# Patient Record
Sex: MALE | Race: WHITE | NOT HISPANIC OR LATINO | Employment: FULL TIME | URBAN - METROPOLITAN AREA
[De-identification: names, ages, dates, MRNs, and addresses within clinical notes are randomized per-mention and may not be internally consistent; named-entity substitution may affect disease eponyms.]

---

## 2024-03-15 ENCOUNTER — OFFICE VISIT (OUTPATIENT)
Dept: PRIMARY CARE | Facility: CLINIC | Age: 64
End: 2024-03-15
Payer: COMMERCIAL

## 2024-03-15 VITALS
OXYGEN SATURATION: 97 % | SYSTOLIC BLOOD PRESSURE: 182 MMHG | HEART RATE: 88 BPM | DIASTOLIC BLOOD PRESSURE: 98 MMHG | HEIGHT: 68 IN | BODY MASS INDEX: 28.34 KG/M2 | WEIGHT: 187 LBS

## 2024-03-15 DIAGNOSIS — J30.81 ALLERGIC RHINITIS DUE TO ANIMAL HAIR AND DANDER: ICD-10-CM

## 2024-03-15 DIAGNOSIS — Z87.898 HISTORY OF ALCOHOL USE: ICD-10-CM

## 2024-03-15 DIAGNOSIS — I10 BENIGN ESSENTIAL HYPERTENSION: ICD-10-CM

## 2024-03-15 DIAGNOSIS — M53.9 MULTILEVEL DEGENERATIVE DISC DISEASE: ICD-10-CM

## 2024-03-15 DIAGNOSIS — Z76.89 ENCOUNTER TO ESTABLISH CARE: Primary | ICD-10-CM

## 2024-03-15 PROCEDURE — 3080F DIAST BP >= 90 MM HG: CPT

## 2024-03-15 PROCEDURE — 3077F SYST BP >= 140 MM HG: CPT

## 2024-03-15 PROCEDURE — 99204 OFFICE O/P NEW MOD 45 MIN: CPT

## 2024-03-15 RX ORDER — METHOCARBAMOL 750 MG/1
TABLET, FILM COATED ORAL
COMMUNITY

## 2024-03-15 RX ORDER — METHYLPREDNISOLONE 4 MG/1
TABLET ORAL
COMMUNITY
End: 2024-04-22 | Stop reason: ALTCHOICE

## 2024-03-15 RX ORDER — HYDROCHLOROTHIAZIDE 12.5 MG/1
12.5 TABLET ORAL DAILY
Qty: 30 TABLET | Refills: 1 | Status: SHIPPED | OUTPATIENT
Start: 2024-03-15 | End: 2024-05-14

## 2024-03-15 RX ORDER — FLUTICASONE PROPIONATE 50 MCG
1 SPRAY, SUSPENSION (ML) NASAL DAILY
Qty: 16 G | Refills: 12 | Status: SHIPPED | OUTPATIENT
Start: 2024-03-15 | End: 2025-03-15

## 2024-03-15 RX ORDER — FLUTICASONE PROPIONATE 50 MCG
1 SPRAY, SUSPENSION (ML) NASAL DAILY
Qty: 16 G | Refills: 12 | Status: SHIPPED | OUTPATIENT
Start: 2024-03-15 | End: 2024-03-15 | Stop reason: SDUPTHER

## 2024-03-15 RX ORDER — IBUPROFEN 200 MG
200 TABLET ORAL EVERY 6 HOURS
COMMUNITY

## 2024-03-15 RX ORDER — LOSARTAN POTASSIUM 50 MG/1
50 TABLET ORAL DAILY
Qty: 30 TABLET | Refills: 1 | Status: SHIPPED | OUTPATIENT
Start: 2024-03-15 | End: 2024-04-22 | Stop reason: SDUPTHER

## 2024-03-15 RX ORDER — MONTELUKAST SODIUM 10 MG/1
10 TABLET ORAL NIGHTLY
Qty: 90 TABLET | Refills: 1 | Status: SHIPPED | OUTPATIENT
Start: 2024-03-15 | End: 2024-09-11

## 2024-03-15 ASSESSMENT — ENCOUNTER SYMPTOMS
PALPITATIONS: 0
NUMBNESS: 1
FATIGUE: 0
LIGHT-HEADEDNESS: 0
SHORTNESS OF BREATH: 0
DIZZINESS: 0

## 2024-03-15 ASSESSMENT — PAIN SCALES - GENERAL: PAINLEVEL: 0-NO PAIN

## 2024-03-15 ASSESSMENT — PATIENT HEALTH QUESTIONNAIRE - PHQ9
1. LITTLE INTEREST OR PLEASURE IN DOING THINGS: NOT AT ALL
SUM OF ALL RESPONSES TO PHQ9 QUESTIONS 1 AND 2: 0
2. FEELING DOWN, DEPRESSED OR HOPELESS: NOT AT ALL

## 2024-03-15 NOTE — PROGRESS NOTES
"Subjective   Patient ID: Yoandy Durán is a 63 y.o. male who presents for Establish Care (Discuss bp meds /la).    Hx HTN, allergic rhinitis, hx of alcohol use (remission x 4 years), cervical and lumbar DDD    Other providers: Dr. Jake Arias (sports-management for back pain), has not seen PCP in 4 years and has been out medication for 4 years    HTN: uncontrolled. In the past was on Losartan- hydrochlorothiazide 100-12.5 mg and 10 mg Amlodipine, has been off for 4 years. No medication side effects when he was on the, Home BP not checking. Denies chest pain, heart palpitations, SOB, dizziness, headaches, changes of vision, syncope, leg swelling.      Allergic rhinitis: currently on dialy anti-histamien which not controlling symptoms. In the past has been on Singulair and Flonase. Will re-send in.       Review of Systems   Constitutional:  Negative for fatigue.   Eyes:  Negative for visual disturbance.   Respiratory:  Negative for shortness of breath.    Cardiovascular:  Negative for chest pain, palpitations and leg swelling.   Neurological:  Positive for numbness. Negative for dizziness, syncope and light-headedness.       Objective   BP (!) 182/98   Pulse 88   Ht 1.727 m (5' 8\")   Wt 84.8 kg (187 lb)   SpO2 97%   BMI 28.43 kg/m²     Physical Exam  Constitutional:       General: He is not in acute distress.     Appearance: Normal appearance.   Cardiovascular:      Rate and Rhythm: Normal rate and regular rhythm.      Heart sounds: No murmur heard.  Pulmonary:      Effort: Pulmonary effort is normal.      Breath sounds: Normal breath sounds. No wheezing, rhonchi or rales.   Skin:     General: Skin is warm and dry.      Findings: No rash.   Neurological:      Mental Status: He is alert.   Psychiatric:         Mood and Affect: Mood and affect normal.         Assessment/Plan   Diagnoses and all orders for this visit:  Encounter to establish care  Allergic rhinitis due to animal hair and dander  -     " montelukast (Singulair) 10 mg tablet; Take 1 tablet (10 mg) by mouth once daily at bedtime.  -     fluticasone (Flonase) 50 mcg/actuation nasal spray; Administer 1 spray into each nostril once daily. Shake gently. Before first use, prime pump. After use, clean tip and replace cap.  Benign essential hypertension  -     losartan (Cozaar) 50 mg tablet; Take 1 tablet (50 mg) by mouth once daily.  -     hydroCHLOROthiazide (Microzide) 12.5 mg tablet; Take 1 tablet (12.5 mg) by mouth once daily.  History of alcohol use  Multilevel degenerative disc disease  Follow-up one month for BP check, once BP stable will schedule CPE. Will order labs in 1-2 months.

## 2024-04-22 ENCOUNTER — OFFICE VISIT (OUTPATIENT)
Dept: PRIMARY CARE | Facility: CLINIC | Age: 64
End: 2024-04-22
Payer: COMMERCIAL

## 2024-04-22 VITALS
HEART RATE: 83 BPM | DIASTOLIC BLOOD PRESSURE: 90 MMHG | OXYGEN SATURATION: 98 % | HEIGHT: 68 IN | SYSTOLIC BLOOD PRESSURE: 168 MMHG | WEIGHT: 185 LBS | BODY MASS INDEX: 28.04 KG/M2

## 2024-04-22 DIAGNOSIS — I10 BENIGN ESSENTIAL HYPERTENSION: ICD-10-CM

## 2024-04-22 PROCEDURE — 99213 OFFICE O/P EST LOW 20 MIN: CPT

## 2024-04-22 PROCEDURE — 3080F DIAST BP >= 90 MM HG: CPT

## 2024-04-22 PROCEDURE — 3077F SYST BP >= 140 MM HG: CPT

## 2024-04-22 RX ORDER — LOSARTAN POTASSIUM 100 MG/1
100 TABLET ORAL DAILY
Qty: 90 TABLET | Refills: 0 | Status: SHIPPED | OUTPATIENT
Start: 2024-04-22 | End: 2024-07-21

## 2024-04-22 ASSESSMENT — ENCOUNTER SYMPTOMS
LIGHT-HEADEDNESS: 0
PALPITATIONS: 0
DIZZINESS: 0
SHORTNESS OF BREATH: 0
FATIGUE: 0

## 2024-04-22 ASSESSMENT — PAIN SCALES - GENERAL: PAINLEVEL: 0-NO PAIN

## 2024-04-22 NOTE — PROGRESS NOTES
"Subjective   Patient ID: Yoandy Durán is a 63 y.o. male who presents for Follow-up (Bp /la).    Hx HTN, allergic rhinitis, hx of alcohol use (remission x 4 years), cervical and lumbar DDD    Other providers: Dr. Jake Arias ([facility] for back pain), has not seen PCP in 4 years and has been out medication for 4 years    HTN: improving. In the past was on Losartan- hydrochlorothiazide 100-12.5 mg and 10 mg Amlodipine, has been off for 4 years. Last visit started on 50 mg Losartan and 12.5 mg hydrochlorothiazide. No medication side effects. Home BP not checking. Denies chest pain, heart palpitations, SOB, dizziness, headaches, changes of vision, syncope, leg swelling.      Allergic rhinitis: currently on dialy anti-histamien which not controlling symptoms. In the past has been on Singulair and Flonase. Will re-send in.       Review of Systems   Constitutional:  Negative for fatigue.   Eyes:  Negative for visual disturbance.   Respiratory:  Negative for shortness of breath.    Cardiovascular:  Negative for chest pain, palpitations and leg swelling.   Neurological:  Negative for dizziness, syncope and light-headedness.       Objective   /90   Pulse 83   Ht 1.727 m (5' 8\")   Wt 83.9 kg (185 lb)   SpO2 98%   BMI 28.13 kg/m²     Physical Exam  Constitutional:       General: He is not in acute distress.     Appearance: Normal appearance.   Cardiovascular:      Rate and Rhythm: Normal rate and regular rhythm.      Heart sounds: No murmur heard.  Pulmonary:      Effort: Pulmonary effort is normal.      Breath sounds: Normal breath sounds. No wheezing, rhonchi or rales.   Musculoskeletal:      Right lower leg: No edema.      Left lower leg: No edema.   Skin:     General: Skin is warm and dry.      Findings: No rash.   Neurological:      Mental Status: He is alert.   Psychiatric:         Mood and Affect: Mood and affect normal.         Assessment/Plan   Diagnoses and all orders for this visit:  Benign " essential hypertension  -     losartan (Cozaar) 100 mg tablet; Take 1 tablet (100 mg) by mouth once daily.  Follow-up one month for BP check, once BP stable will schedule CPE. Will order labs in 1-2 months.

## 2024-05-14 DIAGNOSIS — I10 BENIGN ESSENTIAL HYPERTENSION: ICD-10-CM

## 2024-05-14 RX ORDER — HYDROCHLOROTHIAZIDE 12.5 MG/1
12.5 TABLET ORAL DAILY
Qty: 30 TABLET | Refills: 0 | Status: SHIPPED | OUTPATIENT
Start: 2024-05-14 | End: 2024-05-22 | Stop reason: SDUPTHER

## 2024-05-22 ENCOUNTER — OFFICE VISIT (OUTPATIENT)
Dept: PRIMARY CARE | Facility: CLINIC | Age: 64
End: 2024-05-22
Payer: COMMERCIAL

## 2024-05-22 VITALS
BODY MASS INDEX: 28.04 KG/M2 | OXYGEN SATURATION: 97 % | HEART RATE: 90 BPM | SYSTOLIC BLOOD PRESSURE: 150 MMHG | WEIGHT: 185 LBS | DIASTOLIC BLOOD PRESSURE: 80 MMHG | HEIGHT: 68 IN

## 2024-05-22 DIAGNOSIS — I10 BENIGN ESSENTIAL HYPERTENSION: ICD-10-CM

## 2024-05-22 PROCEDURE — 99213 OFFICE O/P EST LOW 20 MIN: CPT

## 2024-05-22 PROCEDURE — 3077F SYST BP >= 140 MM HG: CPT

## 2024-05-22 PROCEDURE — 3079F DIAST BP 80-89 MM HG: CPT

## 2024-05-22 RX ORDER — AMLODIPINE BESYLATE 5 MG/1
5 TABLET ORAL DAILY
Qty: 30 TABLET | Refills: 1 | Status: SHIPPED | OUTPATIENT
Start: 2024-05-22 | End: 2024-07-21

## 2024-05-22 RX ORDER — HYDROCHLOROTHIAZIDE 12.5 MG/1
12.5 TABLET ORAL DAILY
Qty: 90 TABLET | Refills: 0 | Status: SHIPPED | OUTPATIENT
Start: 2024-05-22

## 2024-05-22 ASSESSMENT — ENCOUNTER SYMPTOMS
SHORTNESS OF BREATH: 0
LIGHT-HEADEDNESS: 0
PALPITATIONS: 0
FATIGUE: 0
DIZZINESS: 0

## 2024-05-22 ASSESSMENT — PATIENT HEALTH QUESTIONNAIRE - PHQ9
2. FEELING DOWN, DEPRESSED OR HOPELESS: NOT AT ALL
1. LITTLE INTEREST OR PLEASURE IN DOING THINGS: NOT AT ALL
SUM OF ALL RESPONSES TO PHQ9 QUESTIONS 1 AND 2: 0

## 2024-05-22 ASSESSMENT — PAIN SCALES - GENERAL: PAINLEVEL: 0-NO PAIN

## 2024-05-22 NOTE — PROGRESS NOTES
"Subjective   Patient ID: Yoandy Durán is a 63 y.o. male who presents for Hypertension (Follow up /la).    Hx HTN, allergic rhinitis, hx of alcohol use (remission x 4 years), cervical and lumbar DDD    Other providers: Dr. Jake Arias ([facility] for back pain), has not seen PCP in 4 years and has been out medication for 4 years    HTN: improving. Last visit increased to 100 mg Losartan, also on 12.5 mg amlodipine. In the past was on Losartan- hydrochlorothiazide 100-12.5 mg and 10 mg Amlodipine, has been off for 4 years. No medication side effects. Home BP not checking. Denies chest pain, heart palpitations, SOB, dizziness, headaches, changes of vision, syncope, leg swelling.      Allergic rhinitis: last visit started on Singulair, which has made significant improvement. Also on daily anti-histamine and Flonase.         Review of Systems   Constitutional:  Negative for fatigue.   Eyes:  Negative for visual disturbance.   Respiratory:  Negative for shortness of breath.    Cardiovascular:  Negative for chest pain, palpitations and leg swelling.   Neurological:  Negative for dizziness, syncope and light-headedness.       Objective   /80   Pulse 90   Ht 1.727 m (5' 8\")   Wt 83.9 kg (185 lb)   SpO2 97%   BMI 28.13 kg/m²     Physical Exam  Constitutional:       General: He is not in acute distress.     Appearance: Normal appearance.   Cardiovascular:      Rate and Rhythm: Normal rate and regular rhythm.      Heart sounds: No murmur heard.  Pulmonary:      Effort: Pulmonary effort is normal.      Breath sounds: Normal breath sounds. No wheezing, rhonchi or rales.   Musculoskeletal:      Right lower leg: No edema.      Left lower leg: No edema.   Skin:     General: Skin is warm and dry.      Findings: No rash.   Neurological:      Mental Status: He is alert.   Psychiatric:         Mood and Affect: Mood and affect normal.         Assessment/Plan   Diagnoses and all orders for this visit:  Benign " essential hypertension  -     amLODIPine (Norvasc) 5 mg tablet; Take 1 tablet (5 mg) by mouth once daily.  -     hydroCHLOROthiazide (Microzide) 12.5 mg tablet; Take 1 tablet (12.5 mg) by mouth once daily.  Follow-up one month, will need to order labs next visit.

## 2024-06-17 ENCOUNTER — OFFICE VISIT (OUTPATIENT)
Dept: PRIMARY CARE | Facility: CLINIC | Age: 64
End: 2024-06-17
Payer: COMMERCIAL

## 2024-06-17 VITALS
WEIGHT: 186 LBS | DIASTOLIC BLOOD PRESSURE: 82 MMHG | OXYGEN SATURATION: 97 % | HEART RATE: 96 BPM | SYSTOLIC BLOOD PRESSURE: 144 MMHG | BODY MASS INDEX: 28.19 KG/M2 | HEIGHT: 68 IN

## 2024-06-17 DIAGNOSIS — Z13.220 SCREENING FOR LIPID DISORDERS: ICD-10-CM

## 2024-06-17 DIAGNOSIS — Z11.59 ENCOUNTER FOR HEPATITIS C SCREENING TEST FOR LOW RISK PATIENT: ICD-10-CM

## 2024-06-17 DIAGNOSIS — I10 BENIGN ESSENTIAL HYPERTENSION: Primary | ICD-10-CM

## 2024-06-17 PROCEDURE — 3077F SYST BP >= 140 MM HG: CPT

## 2024-06-17 PROCEDURE — 3079F DIAST BP 80-89 MM HG: CPT

## 2024-06-17 PROCEDURE — 99213 OFFICE O/P EST LOW 20 MIN: CPT

## 2024-06-17 RX ORDER — HYDROCHLOROTHIAZIDE 12.5 MG/1
12.5 TABLET ORAL DAILY
Qty: 90 TABLET | Refills: 0 | Status: SHIPPED | OUTPATIENT
Start: 2024-06-17

## 2024-06-17 RX ORDER — AMLODIPINE BESYLATE 10 MG/1
10 TABLET ORAL DAILY
Qty: 90 TABLET | Refills: 0 | Status: SHIPPED | OUTPATIENT
Start: 2024-06-17 | End: 2024-09-15

## 2024-06-17 ASSESSMENT — PAIN SCALES - GENERAL: PAINLEVEL: 0-NO PAIN

## 2024-06-17 ASSESSMENT — ENCOUNTER SYMPTOMS
FATIGUE: 0
PALPITATIONS: 0
DIZZINESS: 0
LIGHT-HEADEDNESS: 0
SHORTNESS OF BREATH: 0

## 2024-06-17 ASSESSMENT — PATIENT HEALTH QUESTIONNAIRE - PHQ9
1. LITTLE INTEREST OR PLEASURE IN DOING THINGS: NOT AT ALL
2. FEELING DOWN, DEPRESSED OR HOPELESS: NOT AT ALL
SUM OF ALL RESPONSES TO PHQ9 QUESTIONS 1 AND 2: 0

## 2024-06-17 NOTE — PROGRESS NOTES
"Subjective   Patient ID: Yoandy Durán is a 63 y.o. male who presents for Hypertension (Follow up ).    Hx HTN, allergic rhinitis, hx of alcohol use (remission x 4 years), cervical and lumbar DDD    Other providers: Dr. Jake Arias ([facility] for back pain), has not seen PCP in 4 years and has been out medication for 4 years    HTN: improving. Last visit started on 5 mg Amlodipine. Also on 100 mg Losartan, also on 12.5 mg HCTZ. In the past was on Losartan- hydrochlorothiazide 100-12.5 mg and 10 mg Amlodipine, has been off for 4 years. No medication side effects. Home BP not checking. Denies chest pain, heart palpitations, SOB, dizziness, headaches, changes of vision, syncope, leg swelling.      Allergic rhinitis: Controlled on Singulair, which has made significant improvement. Also on daily anti-histamine and Flonase.         Review of Systems   Constitutional:  Negative for fatigue.   Eyes:  Negative for visual disturbance.   Respiratory:  Negative for shortness of breath.    Cardiovascular:  Negative for chest pain, palpitations and leg swelling.   Neurological:  Negative for dizziness, syncope and light-headedness.       Objective   /82   Pulse 96   Ht 1.727 m (5' 8\")   Wt 84.4 kg (186 lb)   SpO2 97%   BMI 28.28 kg/m²     Physical Exam  Constitutional:       General: He is not in acute distress.     Appearance: Normal appearance.   Cardiovascular:      Rate and Rhythm: Normal rate and regular rhythm.      Heart sounds: No murmur heard.  Pulmonary:      Effort: Pulmonary effort is normal.      Breath sounds: Normal breath sounds. No wheezing, rhonchi or rales.   Musculoskeletal:      Right lower leg: No edema.      Left lower leg: No edema.   Skin:     General: Skin is warm and dry.      Findings: No rash.   Neurological:      Mental Status: He is alert.   Psychiatric:         Mood and Affect: Mood and affect normal.         Assessment/Plan   Diagnoses and all orders for this visit:  Benign " essential hypertension  -     CBC and Auto Differential; Future  -     Comprehensive metabolic panel; Future  -     amLODIPine (Norvasc) 10 mg tablet; Take 1 tablet (10 mg) by mouth once daily.  -     hydroCHLOROthiazide (Microzide) 12.5 mg tablet; Take 1 tablet (12.5 mg) by mouth once daily.  Screening for lipid disorders  -     Lipid panel; Future  Encounter for hepatitis C screening test for low risk patient  -     Hepatitis C Antibody; Future  Increase amlodipine to 10 mg, follow-up in 2-3 months.

## 2024-08-16 DIAGNOSIS — I10 BENIGN ESSENTIAL HYPERTENSION: ICD-10-CM

## 2024-08-16 RX ORDER — LOSARTAN POTASSIUM 100 MG/1
100 TABLET ORAL DAILY
Qty: 90 TABLET | Refills: 1 | Status: SHIPPED | OUTPATIENT
Start: 2024-08-16 | End: 2025-02-12

## 2024-08-19 ENCOUNTER — LAB (OUTPATIENT)
Dept: LAB | Facility: LAB | Age: 64
End: 2024-08-19
Payer: COMMERCIAL

## 2024-08-19 ENCOUNTER — TELEPHONE (OUTPATIENT)
Dept: PRIMARY CARE | Facility: CLINIC | Age: 64
End: 2024-08-19

## 2024-08-19 DIAGNOSIS — Z11.59 ENCOUNTER FOR HEPATITIS C SCREENING TEST FOR LOW RISK PATIENT: ICD-10-CM

## 2024-08-19 DIAGNOSIS — Z13.220 SCREENING FOR LIPID DISORDERS: ICD-10-CM

## 2024-08-19 DIAGNOSIS — I10 BENIGN ESSENTIAL HYPERTENSION: ICD-10-CM

## 2024-08-19 LAB
ALBUMIN SERPL BCP-MCNC: 4.4 G/DL (ref 3.4–5)
ALP SERPL-CCNC: 52 U/L (ref 33–136)
ALT SERPL W P-5'-P-CCNC: 16 U/L (ref 10–52)
ANION GAP SERPL CALC-SCNC: 14 MMOL/L (ref 10–20)
AST SERPL W P-5'-P-CCNC: 17 U/L (ref 9–39)
BASOPHILS # BLD AUTO: 0.07 X10*3/UL (ref 0–0.1)
BASOPHILS NFR BLD AUTO: 1.1 %
BILIRUB SERPL-MCNC: 0.5 MG/DL (ref 0–1.2)
BUN SERPL-MCNC: 29 MG/DL (ref 6–23)
CALCIUM SERPL-MCNC: 9.7 MG/DL (ref 8.6–10.6)
CHLORIDE SERPL-SCNC: 102 MMOL/L (ref 98–107)
CHOLEST SERPL-MCNC: 213 MG/DL (ref 0–199)
CHOLESTEROL/HDL RATIO: 4.5
CO2 SERPL-SCNC: 31 MMOL/L (ref 21–32)
CREAT SERPL-MCNC: 2.07 MG/DL (ref 0.5–1.3)
EGFRCR SERPLBLD CKD-EPI 2021: 36 ML/MIN/1.73M*2
EOSINOPHIL # BLD AUTO: 0.21 X10*3/UL (ref 0–0.7)
EOSINOPHIL NFR BLD AUTO: 3.3 %
ERYTHROCYTE [DISTWIDTH] IN BLOOD BY AUTOMATED COUNT: 13.1 % (ref 11.5–14.5)
GLUCOSE SERPL-MCNC: 98 MG/DL (ref 74–99)
HCT VFR BLD AUTO: 43.9 % (ref 41–52)
HCV AB SER QL: NONREACTIVE
HDLC SERPL-MCNC: 46.9 MG/DL
HGB BLD-MCNC: 14.4 G/DL (ref 13.5–17.5)
IMM GRANULOCYTES # BLD AUTO: 0.02 X10*3/UL (ref 0–0.7)
IMM GRANULOCYTES NFR BLD AUTO: 0.3 % (ref 0–0.9)
LDLC SERPL CALC-MCNC: 138 MG/DL
LYMPHOCYTES # BLD AUTO: 1.32 X10*3/UL (ref 1.2–4.8)
LYMPHOCYTES NFR BLD AUTO: 21 %
MCH RBC QN AUTO: 29.1 PG (ref 26–34)
MCHC RBC AUTO-ENTMCNC: 32.8 G/DL (ref 32–36)
MCV RBC AUTO: 89 FL (ref 80–100)
MONOCYTES # BLD AUTO: 0.37 X10*3/UL (ref 0.1–1)
MONOCYTES NFR BLD AUTO: 5.9 %
NEUTROPHILS # BLD AUTO: 4.31 X10*3/UL (ref 1.2–7.7)
NEUTROPHILS NFR BLD AUTO: 68.4 %
NON HDL CHOLESTEROL: 166 MG/DL (ref 0–149)
NRBC BLD-RTO: 0 /100 WBCS (ref 0–0)
PLATELET # BLD AUTO: 207 X10*3/UL (ref 150–450)
POTASSIUM SERPL-SCNC: 4.5 MMOL/L (ref 3.5–5.3)
PROT SERPL-MCNC: 7 G/DL (ref 6.4–8.2)
RBC # BLD AUTO: 4.95 X10*6/UL (ref 4.5–5.9)
SODIUM SERPL-SCNC: 142 MMOL/L (ref 136–145)
TRIGL SERPL-MCNC: 143 MG/DL (ref 0–149)
VLDL: 29 MG/DL (ref 0–40)
WBC # BLD AUTO: 6.3 X10*3/UL (ref 4.4–11.3)

## 2024-08-19 PROCEDURE — 36415 COLL VENOUS BLD VENIPUNCTURE: CPT

## 2024-08-19 PROCEDURE — 80053 COMPREHEN METABOLIC PANEL: CPT

## 2024-08-19 PROCEDURE — 86803 HEPATITIS C AB TEST: CPT

## 2024-08-19 PROCEDURE — 80061 LIPID PANEL: CPT

## 2024-08-19 PROCEDURE — 85025 COMPLETE CBC W/AUTO DIFF WBC: CPT

## 2024-08-19 NOTE — TELEPHONE ENCOUNTER
We can discuss lab results in more detail at upcoming appointment as long as patient is feeling okay. Patient kidney function was very low, however since I have no prior labs to compare, even labs from 2019 did not include is GFR, unknown if this has been a chronic issue, or if its new onset. If patient if fatigue, nausea, vomiting, and overall not feeling well in the past week then most likely acute kidney injury, if feels find then probably has been chronic problem for years. Also please re-schedule his appointment for 30 min to discuss HTNB/labs/kidney disease

## 2024-08-20 NOTE — TELEPHONE ENCOUNTER
Relayed provider message to patient. Patient had no questions and will discuss further with provider at upcoming office visit

## 2024-08-26 ENCOUNTER — OFFICE VISIT (OUTPATIENT)
Dept: PRIMARY CARE | Facility: CLINIC | Age: 64
End: 2024-08-26
Payer: COMMERCIAL

## 2024-08-26 VITALS
WEIGHT: 183 LBS | SYSTOLIC BLOOD PRESSURE: 140 MMHG | DIASTOLIC BLOOD PRESSURE: 80 MMHG | HEIGHT: 68 IN | BODY MASS INDEX: 27.74 KG/M2 | OXYGEN SATURATION: 96 % | HEART RATE: 91 BPM

## 2024-08-26 DIAGNOSIS — N18.32 STAGE 3B CHRONIC KIDNEY DISEASE (MULTI): ICD-10-CM

## 2024-08-26 DIAGNOSIS — I10 BENIGN ESSENTIAL HYPERTENSION: Primary | ICD-10-CM

## 2024-08-26 LAB
CREAT UR-MCNC: 169.3 MG/DL
MICROALBUMIN UR-MCNC: 145 MG/L (ref 0–23)
MICROALBUMIN/CREAT UR: 85.6 UG/MG CREAT

## 2024-08-26 PROCEDURE — 3008F BODY MASS INDEX DOCD: CPT

## 2024-08-26 PROCEDURE — 82043 UR ALBUMIN QUANTITATIVE: CPT

## 2024-08-26 PROCEDURE — 3077F SYST BP >= 140 MM HG: CPT

## 2024-08-26 PROCEDURE — 3079F DIAST BP 80-89 MM HG: CPT

## 2024-08-26 PROCEDURE — 99213 OFFICE O/P EST LOW 20 MIN: CPT

## 2024-08-26 RX ORDER — METOPROLOL SUCCINATE 25 MG/1
25 TABLET, EXTENDED RELEASE ORAL DAILY
Qty: 30 TABLET | Refills: 1 | Status: SHIPPED | OUTPATIENT
Start: 2024-08-26 | End: 2024-10-25

## 2024-08-26 ASSESSMENT — ENCOUNTER SYMPTOMS
LIGHT-HEADEDNESS: 0
SHORTNESS OF BREATH: 0
FATIGUE: 0
PALPITATIONS: 0
DIZZINESS: 0

## 2024-08-26 ASSESSMENT — PATIENT HEALTH QUESTIONNAIRE - PHQ9
SUM OF ALL RESPONSES TO PHQ9 QUESTIONS 1 AND 2: 0
1. LITTLE INTEREST OR PLEASURE IN DOING THINGS: NOT AT ALL
2. FEELING DOWN, DEPRESSED OR HOPELESS: NOT AT ALL

## 2024-08-26 ASSESSMENT — PAIN SCALES - GENERAL: PAINLEVEL: 0-NO PAIN

## 2024-08-26 NOTE — PROGRESS NOTES
"Subjective   Patient ID: Yoandy Durán is a 63 y.o. male who presents for Follow-up (Bp/ bloodwork /la).    Hx HTN, allergic rhinitis, hx of alcohol use (remission x 4 years), cervical and lumbar DDD    Other providers: Dr. Jake Arias ([facility] for back pain), has not seen PCP in 4 years and has been out medication for 4 years    HTN: improving. Last visit increased to 10 mg Amlodipine. Also on 100 mg Losartan, also on 12.5 mg HCTZ. In the past was on Losartan- hydrochlorothiazide 100-12.5 mg and 10 mg Amlodipine, has been off for 4 years. No medication side effects. Home BP not checking. Denies chest pain, heart palpitations, SOB, dizziness, headaches, changes of vision, syncope, leg swelling.      Labs: recent labs showed GFR of 36. Only previous GFR was 61 back in 2018 to compare too. But creatinine was normal 5 years ago. Patient having no acute symptoms. States has been taking creatine supplement for years.    Not discussed:  Allergic rhinitis: Controlled on Singulair, which has made significant improvement. Also on daily anti-histamine and Flonase.             Review of Systems   Constitutional:  Negative for fatigue.   Eyes:  Negative for visual disturbance.   Respiratory:  Negative for shortness of breath.    Cardiovascular:  Negative for chest pain, palpitations and leg swelling.   Neurological:  Negative for dizziness, syncope and light-headedness.       Objective   /80   Pulse 91   Ht 1.727 m (5' 8\")   Wt 83 kg (183 lb)   SpO2 96%   BMI 27.83 kg/m²     Physical Exam  Constitutional:       General: He is not in acute distress.     Appearance: Normal appearance.   Cardiovascular:      Rate and Rhythm: Normal rate and regular rhythm.      Heart sounds: No murmur heard.  Pulmonary:      Effort: Pulmonary effort is normal.      Breath sounds: Normal breath sounds. No wheezing, rhonchi or rales.   Musculoskeletal:      Right lower leg: No edema.      Left lower leg: No edema.   Skin:    "  General: Skin is warm and dry.      Findings: No rash.   Neurological:      Mental Status: He is alert.   Psychiatric:         Mood and Affect: Mood and affect normal.         Assessment/Plan   Diagnoses and all orders for this visit:  Benign essential hypertension  -     metoprolol succinate XL (Toprol-XL) 25 mg 24 hr tablet; Take 1 tablet (25 mg) by mouth once daily. Do not crush or chew.  Stage 3b chronic kidney disease (Multi)  -     Referral to Nephrology; Future  -     Albumin-Creatinine Ratio, Urine Random; Future  -     Renal function panel; Future  -Stop hydrochlorothiazide, start beta-blocker. Recheck labs in couple weeks. Stop creatine supplement, and increase water intake. If GFR is improving, can cancel nephrologist appointment, but should call to schedule now in case booking out. Follow-up one month.

## 2024-08-27 ENCOUNTER — TELEPHONE (OUTPATIENT)
Dept: PRIMARY CARE | Facility: CLINIC | Age: 64
End: 2024-08-27
Payer: COMMERCIAL

## 2024-08-27 NOTE — TELEPHONE ENCOUNTER
Mild to moderate elevation of protein in urine matches other kidney testing, will monitor for improvement

## 2024-09-10 DIAGNOSIS — J30.81 ALLERGIC RHINITIS DUE TO ANIMAL HAIR AND DANDER: ICD-10-CM

## 2024-09-10 RX ORDER — MONTELUKAST SODIUM 10 MG/1
10 TABLET ORAL NIGHTLY
Qty: 90 TABLET | Refills: 3 | Status: SHIPPED | OUTPATIENT
Start: 2024-09-10

## 2024-09-23 ENCOUNTER — LAB (OUTPATIENT)
Dept: LAB | Facility: LAB | Age: 64
End: 2024-09-23
Payer: COMMERCIAL

## 2024-09-23 DIAGNOSIS — N18.32 STAGE 3B CHRONIC KIDNEY DISEASE (MULTI): ICD-10-CM

## 2024-09-23 LAB
ALBUMIN SERPL BCP-MCNC: 4.8 G/DL (ref 3.4–5)
ANION GAP SERPL CALC-SCNC: 14 MMOL/L (ref 10–20)
BUN SERPL-MCNC: 29 MG/DL (ref 6–23)
CALCIUM SERPL-MCNC: 9.8 MG/DL (ref 8.6–10.6)
CHLORIDE SERPL-SCNC: 102 MMOL/L (ref 98–107)
CO2 SERPL-SCNC: 28 MMOL/L (ref 21–32)
CREAT SERPL-MCNC: 1.77 MG/DL (ref 0.5–1.3)
EGFRCR SERPLBLD CKD-EPI 2021: 43 ML/MIN/1.73M*2
GLUCOSE SERPL-MCNC: 92 MG/DL (ref 74–99)
PHOSPHATE SERPL-MCNC: 3.8 MG/DL (ref 2.5–4.9)
POTASSIUM SERPL-SCNC: 4.3 MMOL/L (ref 3.5–5.3)
SODIUM SERPL-SCNC: 140 MMOL/L (ref 136–145)

## 2024-09-23 PROCEDURE — 80069 RENAL FUNCTION PANEL: CPT

## 2024-09-23 PROCEDURE — 36415 COLL VENOUS BLD VENIPUNCTURE: CPT

## 2024-09-26 ENCOUNTER — TELEPHONE (OUTPATIENT)
Dept: PRIMARY CARE | Facility: CLINIC | Age: 64
End: 2024-09-26
Payer: COMMERCIAL

## 2024-09-30 ENCOUNTER — OFFICE VISIT (OUTPATIENT)
Dept: PRIMARY CARE | Facility: CLINIC | Age: 64
End: 2024-09-30
Payer: COMMERCIAL

## 2024-09-30 ENCOUNTER — TELEPHONE (OUTPATIENT)
Dept: PRIMARY CARE | Facility: CLINIC | Age: 64
End: 2024-09-30

## 2024-09-30 VITALS
WEIGHT: 179 LBS | SYSTOLIC BLOOD PRESSURE: 140 MMHG | OXYGEN SATURATION: 96 % | HEIGHT: 68 IN | HEART RATE: 74 BPM | DIASTOLIC BLOOD PRESSURE: 76 MMHG | BODY MASS INDEX: 27.13 KG/M2

## 2024-09-30 DIAGNOSIS — J30.81 ALLERGIC RHINITIS DUE TO ANIMAL HAIR AND DANDER: ICD-10-CM

## 2024-09-30 DIAGNOSIS — M53.9 MULTILEVEL DEGENERATIVE DISC DISEASE: ICD-10-CM

## 2024-09-30 DIAGNOSIS — Z12.11 SCREENING FOR COLON CANCER: ICD-10-CM

## 2024-09-30 DIAGNOSIS — Z00.00 ANNUAL PHYSICAL EXAM: Primary | ICD-10-CM

## 2024-09-30 DIAGNOSIS — N18.32 STAGE 3B CHRONIC KIDNEY DISEASE (MULTI): Primary | ICD-10-CM

## 2024-09-30 DIAGNOSIS — I10 BENIGN ESSENTIAL HYPERTENSION: ICD-10-CM

## 2024-09-30 DIAGNOSIS — N18.32 STAGE 3B CHRONIC KIDNEY DISEASE (MULTI): ICD-10-CM

## 2024-09-30 PROCEDURE — 99213 OFFICE O/P EST LOW 20 MIN: CPT

## 2024-09-30 PROCEDURE — 99396 PREV VISIT EST AGE 40-64: CPT

## 2024-09-30 PROCEDURE — 3077F SYST BP >= 140 MM HG: CPT

## 2024-09-30 PROCEDURE — 3008F BODY MASS INDEX DOCD: CPT

## 2024-09-30 PROCEDURE — 3078F DIAST BP <80 MM HG: CPT

## 2024-09-30 RX ORDER — METOPROLOL SUCCINATE 50 MG/1
50 TABLET, EXTENDED RELEASE ORAL DAILY
Qty: 90 TABLET | Refills: 0 | Status: SHIPPED | OUTPATIENT
Start: 2024-09-30 | End: 2024-12-29

## 2024-09-30 ASSESSMENT — ENCOUNTER SYMPTOMS
LIGHT-HEADEDNESS: 0
BLOOD IN STOOL: 0
DIAPHORESIS: 0
BACK PAIN: 1
FREQUENCY: 0
ADENOPATHY: 0
SORE THROAT: 0
DYSURIA: 0
FEVER: 0
DIZZINESS: 0
VOMITING: 0
NAUSEA: 0
SHORTNESS OF BREATH: 0
DIARRHEA: 0
FATIGUE: 0
WHEEZING: 0
COUGH: 0
PALPITATIONS: 0
HEMATURIA: 0
DIFFICULTY URINATING: 0

## 2024-09-30 ASSESSMENT — PAIN SCALES - GENERAL: PAINLEVEL: 0-NO PAIN

## 2024-09-30 NOTE — TELEPHONE ENCOUNTER
Dr. Townsend already responded back. She is in agreement with current BP medications. She suggested one more additional kidney function whitney called Cystatin C, that I have added onto your other future blood work. Otherwise she feels you are stable until your January visit with her.

## 2024-09-30 NOTE — PROGRESS NOTES
Subjective   Patient ID: Yoandy Durán is a 63 y.o. male who presents for Follow-up (HTN/CKD /la).    Hx HTN, allergic rhinitis, hx of alcohol use (remission x 4 years), cervical and lumbar DDD    Other providers: Dr. Jake Arias ([facility] for back pain), has not seen PCP in 4 years and has been out medication for 4 years    HTN: improving. Last visit started on 25 mg Metoprolol. No side effects. Also on 10 mg Amlodipine and 100 mg Losartan. Was off his normal BP medications for 4 years. No medication side effects. Home BP not checking. Denies chest pain, heart palpitations, SOB, dizziness, headaches, changes of vision, syncope, leg swelling.      Low Kidney function: Last visit stopped diuretic, and patient stopped creatine supplement. GFR improved from 36 to 43 and 2.07 to 1.77 (9/23/24). Only previous GFR was 61 back in 2018 to compare too. But creatinine was normal 5 years ago. Referred to Dr. Townsend (nephrologist) appointment not until 1/27/24. Urine albumin elevated at 145.     Allergic rhinitis: Controlled on Singulair, which has made significant improvement. Also on daily anti-histamine and Flonase.    Fhx: Mother TIA    HM: Colon screening, will do cologuard. PSA discussed skip. Lung screening never smoker. Vaccinations Tdap 6/2015 UTD, Shingrex declined, flu and covid declines.              Review of Systems   Constitutional:  Negative for diaphoresis, fatigue and fever.   HENT:  Negative for congestion, hearing loss and sore throat.    Eyes:  Negative for visual disturbance.   Respiratory:  Negative for cough, shortness of breath and wheezing.    Cardiovascular:  Negative for chest pain, palpitations and leg swelling.   Gastrointestinal:  Negative for blood in stool, diarrhea, nausea and vomiting.   Genitourinary:  Negative for difficulty urinating, dysuria, frequency, hematuria and urgency.   Musculoskeletal:  Positive for back pain.   Skin:  Negative for rash.   Allergic/Immunologic:  "Negative for immunocompromised state.   Neurological:  Negative for dizziness, syncope and light-headedness.   Hematological:  Negative for adenopathy.   Psychiatric/Behavioral:  Negative for suicidal ideas.        Objective   /76   Pulse 74   Ht 1.727 m (5' 8\")   Wt 81.2 kg (179 lb)   SpO2 96%   BMI 27.22 kg/m²     Physical Exam  Constitutional:       General: He is not in acute distress.     Appearance: Normal appearance.   HENT:      Right Ear: Tympanic membrane and ear canal normal.      Left Ear: Tympanic membrane and ear canal normal.      Nose: Nose normal.      Mouth/Throat:      Mouth: Mucous membranes are moist.      Pharynx: Oropharynx is clear. No oropharyngeal exudate or posterior oropharyngeal erythema.   Eyes:      Extraocular Movements: Extraocular movements intact.      Conjunctiva/sclera: Conjunctivae normal.      Pupils: Pupils are equal, round, and reactive to light.   Neck:      Thyroid: No thyroid mass or thyromegaly.   Cardiovascular:      Rate and Rhythm: Normal rate and regular rhythm.      Pulses: Normal pulses.      Heart sounds: No murmur heard.     No gallop.   Pulmonary:      Effort: Pulmonary effort is normal.      Breath sounds: Normal breath sounds. No wheezing, rhonchi or rales.   Abdominal:      General: Bowel sounds are normal.      Palpations: Abdomen is soft. There is no hepatomegaly, splenomegaly or mass.      Tenderness: There is no abdominal tenderness. There is no guarding.   Musculoskeletal:         General: Normal range of motion.      Right lower leg: No edema.      Left lower leg: No edema.   Lymphadenopathy:      Cervical: No cervical adenopathy.   Skin:     General: Skin is warm and dry.      Findings: No rash.   Neurological:      General: No focal deficit present.      Mental Status: He is alert.      Motor: Motor function is intact. No weakness.   Psychiatric:         Mood and Affect: Mood and affect normal.         Thought Content: Thought content " normal.         Assessment/Plan   Diagnoses and all orders for this visit:  Annual physical exam  Benign essential hypertension  -     metoprolol succinate XL (Toprol-XL) 50 mg 24 hr tablet; Take 1 tablet (50 mg) by mouth once daily. Do not crush or chew.  -     Basic metabolic panel; Future  Stage 3b chronic kidney disease (Multi)  -     Basic metabolic panel; Future  Screening for colon cancer  -     Cologuard® colon cancer screening; Future  Allergic rhinitis due to animal hair and dander  Multilevel degenerative disc disease  Will message nephrologist for consult since appointment is so far out. For now increase Metoprolol for better HTN control and repeat BMP in 4-8 weeks. Follow-up 3 months.

## 2024-10-14 ENCOUNTER — LAB (OUTPATIENT)
Dept: LAB | Facility: LAB | Age: 64
End: 2024-10-14
Payer: COMMERCIAL

## 2024-10-14 DIAGNOSIS — I10 BENIGN ESSENTIAL HYPERTENSION: ICD-10-CM

## 2024-10-14 DIAGNOSIS — N18.32 STAGE 3B CHRONIC KIDNEY DISEASE (MULTI): ICD-10-CM

## 2024-10-14 LAB
ANION GAP SERPL CALC-SCNC: 11 MMOL/L (ref 10–20)
BUN SERPL-MCNC: 28 MG/DL (ref 6–23)
CALCIUM SERPL-MCNC: 10.2 MG/DL (ref 8.6–10.6)
CHLORIDE SERPL-SCNC: 100 MMOL/L (ref 98–107)
CO2 SERPL-SCNC: 33 MMOL/L (ref 21–32)
CREAT SERPL-MCNC: 1.76 MG/DL (ref 0.5–1.3)
EGFRCR SERPLBLD CKD-EPI 2021: 43 ML/MIN/1.73M*2
GLUCOSE SERPL-MCNC: 109 MG/DL (ref 74–99)
POTASSIUM SERPL-SCNC: 4.2 MMOL/L (ref 3.5–5.3)
SODIUM SERPL-SCNC: 140 MMOL/L (ref 136–145)

## 2024-10-14 PROCEDURE — 80048 BASIC METABOLIC PNL TOTAL CA: CPT

## 2024-10-14 PROCEDURE — 82610 CYSTATIN C: CPT

## 2024-10-14 PROCEDURE — 36415 COLL VENOUS BLD VENIPUNCTURE: CPT

## 2024-10-15 LAB
CYSTATIN C SERPL-MCNC: 1.3 MG/L (ref 0.5–1.2)
GFR/BSA.PRED SERPLBLD CYS-BASED-ARV: 54 ML/MIN/BSA

## 2024-11-25 ENCOUNTER — LAB (OUTPATIENT)
Dept: LAB | Facility: LAB | Age: 64
End: 2024-11-25
Payer: COMMERCIAL

## 2024-12-30 ENCOUNTER — APPOINTMENT (OUTPATIENT)
Dept: PRIMARY CARE | Facility: CLINIC | Age: 64
End: 2024-12-30
Payer: COMMERCIAL

## 2025-01-02 ENCOUNTER — APPOINTMENT (OUTPATIENT)
Dept: PRIMARY CARE | Facility: CLINIC | Age: 65
End: 2025-01-02
Payer: COMMERCIAL

## 2025-01-02 VITALS
DIASTOLIC BLOOD PRESSURE: 100 MMHG | OXYGEN SATURATION: 98 % | WEIGHT: 183 LBS | HEART RATE: 80 BPM | BODY MASS INDEX: 27.74 KG/M2 | SYSTOLIC BLOOD PRESSURE: 144 MMHG | HEIGHT: 68 IN

## 2025-01-02 DIAGNOSIS — J30.81 ALLERGIC RHINITIS DUE TO ANIMAL HAIR AND DANDER: ICD-10-CM

## 2025-01-02 DIAGNOSIS — I10 BENIGN ESSENTIAL HYPERTENSION: ICD-10-CM

## 2025-01-02 DIAGNOSIS — N18.32 STAGE 3B CHRONIC KIDNEY DISEASE (MULTI): Primary | ICD-10-CM

## 2025-01-02 PROCEDURE — 3080F DIAST BP >= 90 MM HG: CPT

## 2025-01-02 PROCEDURE — 3077F SYST BP >= 140 MM HG: CPT

## 2025-01-02 PROCEDURE — 3008F BODY MASS INDEX DOCD: CPT

## 2025-01-02 PROCEDURE — 99213 OFFICE O/P EST LOW 20 MIN: CPT

## 2025-01-02 RX ORDER — AMLODIPINE BESYLATE 10 MG/1
10 TABLET ORAL DAILY
Qty: 90 TABLET | Refills: 1 | Status: SHIPPED | OUTPATIENT
Start: 2025-01-02 | End: 2025-07-01

## 2025-01-02 RX ORDER — LOSARTAN POTASSIUM 100 MG/1
100 TABLET ORAL DAILY
Qty: 90 TABLET | Refills: 1 | Status: SHIPPED | OUTPATIENT
Start: 2025-01-02 | End: 2025-07-01

## 2025-01-02 RX ORDER — METOPROLOL SUCCINATE 50 MG/1
50 TABLET, EXTENDED RELEASE ORAL DAILY
Qty: 90 TABLET | Refills: 1 | Status: SHIPPED | OUTPATIENT
Start: 2025-01-02 | End: 2025-07-01

## 2025-01-02 ASSESSMENT — ENCOUNTER SYMPTOMS
PALPITATIONS: 0
FATIGUE: 0
DIZZINESS: 0
LIGHT-HEADEDNESS: 0
SHORTNESS OF BREATH: 0

## 2025-01-02 ASSESSMENT — PAIN SCALES - GENERAL: PAINLEVEL_OUTOF10: 0-NO PAIN

## 2025-01-02 NOTE — PROGRESS NOTES
"Subjective   Patient ID: Yoandy Durán is a 64 y.o. male who presents for Follow-up and Medication Problem (Patient is wanting to discuss blood pressure, unsure of which ones he should be taking/dd).    Hx HTN, allergic rhinitis, hx of alcohol use (remission x 4 years), cervical and lumbar DDD    Other providers: Dr. Jake Arias ([facility]ports[facility] for back pain), has not seen PCP in 4 years and has been out medication for 4 years    HTN: not controlled but patient only taking 2 out 3 of his medications and not sure which one. Should be on 50 mg Metoprolol, 10 mg Amlodipine, and 100 mg Losartan. Home BP not checking. Denies chest pain, heart palpitations, SOB, dizziness, headaches, changes of vision, syncope, leg swelling.      Low Kidney function: In the past stopped diuretic, and patient stopped creatine supplement. GFR improved from 36 to 43 and 2.07 to 1.77 (9/23/24). Only previous GFR was 61 back in 2018 to compare too. But creatinine was normal 5 years ago. Referred to Dr. Townsend (nephrologist) appointment not until 1/27/24. Urine albumin elevated at 145. Has apt with nephrologist this month.     Allergic rhinitis: Controlled on Singulair, which has made significant improvement. Also on daily anti-histamine and Flonase.           Review of Systems   Constitutional:  Negative for fatigue.   Eyes:  Negative for visual disturbance.   Respiratory:  Negative for shortness of breath.    Cardiovascular:  Negative for chest pain, palpitations and leg swelling.   Neurological:  Negative for dizziness, syncope and light-headedness.       Objective   BP (!) 144/100   Pulse 80   Ht 1.727 m (5' 8\")   Wt 83 kg (183 lb)   SpO2 98%   BMI 27.83 kg/m²     Physical Exam  Constitutional:       General: He is not in acute distress.     Appearance: Normal appearance.   Cardiovascular:      Rate and Rhythm: Normal rate and regular rhythm.      Heart sounds: No murmur heard.  Pulmonary:      Effort: Pulmonary effort is " normal.      Breath sounds: Normal breath sounds. No wheezing, rhonchi or rales.   Musculoskeletal:      Right lower leg: No edema.      Left lower leg: No edema.   Skin:     General: Skin is warm and dry.      Findings: No rash.   Neurological:      Mental Status: He is alert.   Psychiatric:         Mood and Affect: Mood and affect normal.         Assessment/Plan   Diagnoses and all orders for this visit:  Benign essential hypertension  -     amLODIPine (Norvasc) 10 mg tablet; Take 1 tablet (10 mg) by mouth once daily.  -     losartan (Cozaar) 100 mg tablet; Take 1 tablet (100 mg) by mouth once daily.  -     metoprolol succinate XL (Toprol-XL) 50 mg 24 hr tablet; Take 1 tablet (50 mg) by mouth once daily. Do not crush or chew.  -     Comprehensive metabolic panel; Future  Will not adjust medication since currently not taking all three of his medications. Follow-up in 3 months, whatever one he is missing, take half tablet for 1-2 weeks then increase to full tablet. Obtain labs.

## 2025-01-27 ENCOUNTER — APPOINTMENT (OUTPATIENT)
Dept: NEPHROLOGY | Facility: CLINIC | Age: 65
End: 2025-01-27
Payer: COMMERCIAL

## 2025-01-27 VITALS
BODY MASS INDEX: 27.13 KG/M2 | WEIGHT: 179 LBS | TEMPERATURE: 97.7 F | HEIGHT: 68 IN | HEART RATE: 80 BPM | OXYGEN SATURATION: 98 % | SYSTOLIC BLOOD PRESSURE: 149 MMHG | DIASTOLIC BLOOD PRESSURE: 93 MMHG

## 2025-01-27 DIAGNOSIS — I10 BENIGN ESSENTIAL HYPERTENSION: ICD-10-CM

## 2025-01-27 DIAGNOSIS — N18.32 STAGE 3B CHRONIC KIDNEY DISEASE (MULTI): Primary | ICD-10-CM

## 2025-01-27 PROCEDURE — 3077F SYST BP >= 140 MM HG: CPT | Performed by: INTERNAL MEDICINE

## 2025-01-27 PROCEDURE — 3080F DIAST BP >= 90 MM HG: CPT | Performed by: INTERNAL MEDICINE

## 2025-01-27 PROCEDURE — 99205 OFFICE O/P NEW HI 60 MIN: CPT | Performed by: INTERNAL MEDICINE

## 2025-01-27 PROCEDURE — 3008F BODY MASS INDEX DOCD: CPT | Performed by: INTERNAL MEDICINE

## 2025-01-27 PROCEDURE — G2211 COMPLEX E/M VISIT ADD ON: HCPCS | Performed by: INTERNAL MEDICINE

## 2025-01-27 NOTE — PATIENT INSTRUCTIONS
Dear Yoandy-it was nice meeting you nephrology clinic today discuss the following    # Chronic kidney stage IIIa-baseline kidney function 45-55%.  Will continue to monitor.  We discussed appropriate fluid intake, avoid nonsteroidal anti-inflammatory drugs.  Will hold off creatinine supplement at this time    # Hypertension-accepted control.  Continue amlodipine 10 mg, losartan 100 mg, metoprolol 50 mg.  Today we discussed checking blood pressure at home.  Average reading should range 110-140.  If blood pressure reading is consistently above 150 please call my office to adjust medications    # Will repeat blood work today-recommendation to follow.  Repeat blood work prior to next visit in 6 months    Shashi Townsend MD, MS, A[facility]REILL[facility]   Clinical  - [facility] [employer] [facility] [employer]   Nephrologist - [facility]

## 2025-01-27 NOTE — PROGRESS NOTES
"Subjective       Yoandy Durán is a 64 y.o. male who has past medical history of hypertension, chronic back pain with significant NSAID use, creatinine supplement was coming to see me today initial consultation for elevated serum creatinine per PCP    Yoandy came alone today.  No immediate complaints or concerns.  He was made aware of worsening kidney function in the summer 2024 since then he stopped taking NSAIDs and creatinine supplements.  He denies lower urinary tract symptoms.  No leg swelling or shortness of breath.  No cardiac comorbidities.  No diabetes.  He does not check blood pressure at home.  He denies family history of kidney disease.  He has a [employer] shop.  He vapes marijuana.  He chewed tobacco.  He does not drink      Objective   BP (!) 149/93 (BP Location: Left arm, Patient Position: Standing, BP Cuff Size: Adult)   Pulse 80   Temp 36.5 °C (97.7 °F)   Ht 1.727 m (5' 8\")   Wt 81.2 kg (179 lb)   SpO2 98%   BMI 27.22 kg/m²   Wt Readings from Last 3 Encounters:   01/27/25 81.2 kg (179 lb)   01/02/25 83 kg (183 lb)   09/30/24 81.2 kg (179 lb)       Physical Exam    General appearance: no distress awake and alert on room air, euvolemic on exam  Eyes: non-icteric  HEENT: atrumatic head, PEERLA, moist mucosa  Skin: no apparent rash  Heart: NSR, S1, S2 normal, no murmur or gallop  Lungs: Symmetrical expansion,CTA bilat no wheezing/crackles  Abdomen: soft, nt/nd, obese  Extremities: no edema bilat  Neuro: No FND,asterixis, no focal deficits noticed        Review of Systems     Constitutional: no fever, no chills, no recent weight gain and no recent weight loss.   Eyes: no blurred vision and no diplopia.   ENT: no hearing loss, no earache, no sore throat, no swollen glands in the neck and no nasal discharge.   Cardiovascular: no chest pain, no palpitations and no lower extremity edema.   Respiratory: no shortness of breath, no chronic cough and no shortness of breath during exertion. " "  Gastrointestinal: no abdominal pain, no constipation, no heartburn, no vomiting, no bloody stools and no change in bowel movements.   Genitourinary: no dysuria and no hematuria.   Musculoskeletal: Back pain  Skin: no rashes and no skin lesions.   Neurological: no headaches and no dizziness.   Psychiatric: no confusion, no depression and no anxiety.   Endocrine: no heat intolerance, no cold intolerance, appetite not increased, no thyroid disorder, no increased urinary frequency and no dry skin.   Hematologic/Lymphatic: does not bleed easily and does not bruise easily.   All other systems have been reviewed and are negative for complaint.         Data Review                   No results found for: \"URICACID\"        No results found for: \"HGBA1C\"              No lab exists for component: \"CR\", \"PHOSPHORUS\"        Albumin/Creatinine Ratio   Date Value Ref Range Status   08/26/2024 85.6 ug/mg Creat Final            RFP  Recent Labs     10/14/24  0923 09/23/24  0944 08/19/24  0844 08/11/19  0441 08/10/19  0500 08/09/19  1349 04/24/19  1847 01/04/19  0930 12/22/18  1654 06/27/18  1230    140 142 139 135 125* 137   < > 129* 137   K 4.2 4.3 4.5 3.9 4.3 4.2 4.2   < > 4.0 4.1    102 102 100 95* 84* 96*   < > 88* 97   CO2 33* 28 31 27 25 16* 22*   < > 22* 20*   BUN 28* 29* 29* 19 24 32* 20   < > 13 18   CREATININE 1.76* 1.77* 2.07* 1.4 1.3 1.5 1.5   < > 1.3 1.3   GLUCOSE 109* 92 98 126* 84 101* 92   < > 126* 97   CALCIUM 10.2 9.8 9.7 9.4 9.3 9.3 9.1   < > 8.9 9.4   PHOS  --  3.8  --   --  3.1  --  4.6*  --   --   --    EGFR 43* 43* 36*  --   --  51 51  --  61 61   ANIONGAP 11 14 14 12 15 25* 19   < > 19 20*    < > = values in this interval not displayed.        Urineanalysis  Recent Labs     04/24/19  1847 12/22/18  1654   COLORU PALE YELLOW PALE YELLOW   APPEARANCEU CLEAR CLEAR   SPECGRAVU 1.006 1.007   YAMILETH 6.0 5.5   PROTUR NEGATIVE NEGATIVE   GLUCOSEU NEGATIVE NEGATIVE   BLOODU NEGATIVE NEGATIVE   KETONESU " "NEGATIVE NEGATIVE   BILIRUBINU NEGATIVE NEGATIVE   NITRITEU NEGATIVE NEGATIVE   LEUKOCYTESU NEGATIVE NEGATIVE       Urine Electrolytes  Recent Labs     08/26/24  1041 04/24/19  1847 12/22/18  1654   CREATU 169.3  --   --    PROTUR  --  NEGATIVE NEGATIVE   ALBUMINUR 145.0*  --   --    MICROALBCREA 85.6  --   --         Urine Micro  Recent Labs     04/24/19  1847 12/22/18  1654   WBCU NONE SEEN NONE SEEN   RBCU 3 NONE SEEN   HYALCASTU NONE SEEN  --    SQUAMEPIU NONE SEEN  --    BACTERIAU NEGATIVE  --         Iron  No results for input(s): \"IRON\", \"TIBC\", \"IRONSAT\", \"FERRITIN\" in the last 09801 hours.       Current Outpatient Medications on File Prior to Visit   Medication Sig Dispense Refill    amLODIPine (Norvasc) 10 mg tablet Take 1 tablet (10 mg) by mouth once daily. 90 tablet 1    fluticasone (Flonase) 50 mcg/actuation nasal spray Administer 1 spray into each nostril once daily. Shake gently. Before first use, prime pump. After use, clean tip and replace cap. 16 g 12    ibuprofen 200 mg tablet Take 1 tablet (200 mg) by mouth if needed for mild pain (1 - 3). Pt. Reports once weekly      losartan (Cozaar) 100 mg tablet Take 1 tablet (100 mg) by mouth once daily. 90 tablet 1    methocarbamol (Robaxin) 750 mg tablet take 1 tablet by mouth two times every day      metoprolol succinate XL (Toprol-XL) 50 mg 24 hr tablet Take 1 tablet (50 mg) by mouth once daily. Do not crush or chew. 90 tablet 1    montelukast (Singulair) 10 mg tablet TAKE ONE TABLET BY MOUTH ONCE DAILY AT BEDTIME 90 tablet 3     No current facility-administered medications on file prior to visit.           Assessment and Plan       Yoandy Durán  is a 64 y.o. male who has past medical history of hypertension, chronic back pain with significant NSAID use, creatinine supplement was coming to see me today initial consultation for elevated serum creatinine per PCP    # Elevated serum creatinine/chronic kidney disease stage IIIa/A1-  -Baseline serum " creatinine 1.7-2, GFR 35-45 by CKD-EPI.  Cystatin C 1.3, GFR Cystatin C 54 which I think is more accurate  -I think this gentleman has a background of chronic kidney disease due to NSAID use.  Serum creatinine is significantly elevated (up to 2) most likely due to creatinine supplements.  Currently he is off NSAIDs/creatinine supplements.  Will start monitoring serum creatinine and Cystatin C  -Within normal electrolytes  -Prior spot test ACR is negative  -No recent kidney image to review-will defer at this time      # Hypertension-elevated today.  He is anxious about the visit?  Whitecoat syndrome  -Current medication amlodipine 10 mg, losartan 100 mg, metoprolol 50 mg.  Currently off diuretics  -He will start checking at home and report if it is elevated    # No significant anemia-continue to monitor    # CKD-MBD.  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 If to be done- we recommend holding ACEi/ARBS/diuretics 24 hrs prior to contrast exposure and ensure appropriate hydration   - Ensure well hydration  - Limit salt in diet  - No smoking    Patient received CKD education and counselling  Blood work today  Follow-up in 6 months with review of other canasta prior to visit    Shashi Townsend MD, MS, ASHER OREILLY   Clinical  - [facility] [employer]   Nephrologist - [facility]

## 2025-04-14 ENCOUNTER — OFFICE VISIT (OUTPATIENT)
Dept: PRIMARY CARE | Facility: CLINIC | Age: 65
End: 2025-04-14
Payer: COMMERCIAL

## 2025-04-14 VITALS
OXYGEN SATURATION: 96 % | WEIGHT: 187 LBS | BODY MASS INDEX: 28.43 KG/M2 | DIASTOLIC BLOOD PRESSURE: 80 MMHG | SYSTOLIC BLOOD PRESSURE: 142 MMHG | HEART RATE: 62 BPM

## 2025-04-14 DIAGNOSIS — J30.81 ALLERGIC RHINITIS DUE TO ANIMAL HAIR AND DANDER: ICD-10-CM

## 2025-04-14 DIAGNOSIS — F51.01 PRIMARY INSOMNIA: ICD-10-CM

## 2025-04-14 DIAGNOSIS — I10 BENIGN ESSENTIAL HYPERTENSION: Primary | ICD-10-CM

## 2025-04-14 PROCEDURE — 3079F DIAST BP 80-89 MM HG: CPT

## 2025-04-14 PROCEDURE — 99214 OFFICE O/P EST MOD 30 MIN: CPT

## 2025-04-14 PROCEDURE — 3077F SYST BP >= 140 MM HG: CPT

## 2025-04-14 RX ORDER — FLUTICASONE PROPIONATE 50 MCG
1 SPRAY, SUSPENSION (ML) NASAL DAILY
Qty: 16 G | Refills: 12 | Status: SHIPPED | OUTPATIENT
Start: 2025-04-14 | End: 2026-04-14

## 2025-04-14 RX ORDER — OLMESARTAN MEDOXOMIL 40 MG/1
40 TABLET ORAL DAILY
Qty: 90 TABLET | Refills: 0 | Status: SHIPPED | OUTPATIENT
Start: 2025-04-14 | End: 2025-10-11

## 2025-04-14 RX ORDER — HYDROXYZINE HYDROCHLORIDE 25 MG/1
25 TABLET, FILM COATED ORAL DAILY
Qty: 30 TABLET | Refills: 1 | Status: SHIPPED | OUTPATIENT
Start: 2025-04-14 | End: 2025-06-13

## 2025-04-14 ASSESSMENT — ENCOUNTER SYMPTOMS
PALPITATIONS: 0
SLEEP DISTURBANCE: 1
DIZZINESS: 0
LIGHT-HEADEDNESS: 0
FATIGUE: 0
SHORTNESS OF BREATH: 0

## 2025-04-14 ASSESSMENT — PAIN SCALES - GENERAL: PAINLEVEL_OUTOF10: 5

## 2025-04-14 NOTE — PROGRESS NOTES
Subjective   Patient ID: Yoandy Durán is a 64 y.o. male who presents for Blood Pressure Check.    Hx HTN, allergic rhinitis, hx of alcohol use (remission x 4 years), cervical and lumbar DDD    Other providers: Dr. Jake rAias ([facility]), has not seen PCP in 4 years and has been out medication for 4 years    HTN: uncontrolled but improving. Should be on 50 mg Metoprolol, 10 mg Amlodipine, and 100 mg Losartan. Home BP not checking. Denies chest pain, heart palpitations, SOB, dizziness, headaches, changes of vision, syncope, leg swelling.      Sleep issues: more trouble falling asleep than staying asleep. Has not tried OTC besides melatonin. Has good sleep hygiene.     Not discussed:   Low Kidney function:, UPDATE saw nephrologista nd has 6 month follow-up. Due for labs reprinted. Recall:  In the past stopped diuretic, and patient stopped creatine supplement. GFR improved from 36 to 43 and 2.07 to 1.77 (9/23/24). Only previous GFR was 61 back in 2018 to compare too. But creatinine was normal 5 years ago. Referred to Dr. Townsend (nephrologist) appointment not until 1/27/24. Urine albumin elevated at 145. Has apt with nephrologist this month.     Allergic rhinitis: Controlled on Singulair, which has made significant improvement. Also on daily anti-histamine and Flonase.             Review of Systems   Constitutional:  Negative for fatigue.   Eyes:  Negative for visual disturbance.   Respiratory:  Negative for shortness of breath.    Cardiovascular:  Negative for chest pain, palpitations and leg swelling.   Neurological:  Negative for dizziness, syncope and light-headedness.   Psychiatric/Behavioral:  Positive for sleep disturbance.        Objective   /80   Wt 84.8 kg (187 lb)   BMI 28.43 kg/m²     Physical Exam  Constitutional:       General: He is not in acute distress.     Appearance: Normal appearance.   Cardiovascular:      Rate and Rhythm: Normal rate and regular rhythm.       Heart sounds: No murmur heard.  Pulmonary:      Effort: Pulmonary effort is normal.      Breath sounds: Normal breath sounds. No wheezing, rhonchi or rales.   Skin:     General: Skin is warm and dry.      Findings: No rash.   Neurological:      Mental Status: He is alert.   Psychiatric:         Mood and Affect: Mood and affect normal.         Assessment/Plan   Diagnoses and all orders for this visit:  Benign essential hypertension  -     olmesartan (BENIcar) 40 mg tablet; Take 1 tablet (40 mg) by mouth once daily.  Allergic rhinitis due to animal hair and dander  -     fluticasone (Flonase) 50 mcg/actuation nasal spray; Administer 1 spray into each nostril once daily. Shake gently. Before first use, prime pump. After use, clean tip and replace cap.  Primary insomnia  -     hydrOXYzine HCL (Atarax) 25 mg tablet; Take 1 tablet (25 mg) by mouth once daily.  Follow-up one month for HTN/Sleep

## 2025-04-21 ENCOUNTER — OFFICE VISIT (OUTPATIENT)
Dept: PRIMARY CARE | Facility: CLINIC | Age: 65
End: 2025-04-21
Payer: COMMERCIAL

## 2025-04-21 VITALS
HEIGHT: 68 IN | DIASTOLIC BLOOD PRESSURE: 76 MMHG | OXYGEN SATURATION: 98 % | SYSTOLIC BLOOD PRESSURE: 152 MMHG | BODY MASS INDEX: 28.49 KG/M2 | HEART RATE: 84 BPM | WEIGHT: 188 LBS

## 2025-04-21 DIAGNOSIS — I10 BENIGN ESSENTIAL HYPERTENSION: ICD-10-CM

## 2025-04-21 DIAGNOSIS — J30.81 ALLERGIC RHINITIS DUE TO ANIMAL HAIR AND DANDER: ICD-10-CM

## 2025-04-21 DIAGNOSIS — M10.371 ACUTE GOUT DUE TO RENAL IMPAIRMENT INVOLVING TOE OF RIGHT FOOT: Primary | ICD-10-CM

## 2025-04-21 PROCEDURE — 3077F SYST BP >= 140 MM HG: CPT

## 2025-04-21 PROCEDURE — 3008F BODY MASS INDEX DOCD: CPT

## 2025-04-21 PROCEDURE — 3078F DIAST BP <80 MM HG: CPT

## 2025-04-21 PROCEDURE — 99214 OFFICE O/P EST MOD 30 MIN: CPT

## 2025-04-21 RX ORDER — PREDNISONE 10 MG/1
TABLET ORAL
Qty: 27 TABLET | Refills: 0 | Status: SHIPPED | OUTPATIENT
Start: 2025-04-21 | End: 2025-05-01

## 2025-04-21 RX ORDER — MONTELUKAST SODIUM 10 MG/1
10 TABLET ORAL NIGHTLY
Qty: 90 TABLET | Refills: 1 | Status: SHIPPED | OUTPATIENT
Start: 2025-04-21

## 2025-04-21 RX ORDER — AMLODIPINE BESYLATE 10 MG/1
10 TABLET ORAL DAILY
Qty: 90 TABLET | Refills: 1 | Status: SHIPPED | OUTPATIENT
Start: 2025-04-21 | End: 2025-10-18

## 2025-04-21 RX ORDER — METOPROLOL SUCCINATE 50 MG/1
50 TABLET, EXTENDED RELEASE ORAL DAILY
Qty: 90 TABLET | Refills: 1 | Status: SHIPPED | OUTPATIENT
Start: 2025-04-21 | End: 2025-10-18

## 2025-04-21 ASSESSMENT — ENCOUNTER SYMPTOMS
ARTHRALGIAS: 1
JOINT SWELLING: 1
CHILLS: 0
FEVER: 0

## 2025-04-21 ASSESSMENT — PATIENT HEALTH QUESTIONNAIRE - PHQ9
2. FEELING DOWN, DEPRESSED OR HOPELESS: NOT AT ALL
SUM OF ALL RESPONSES TO PHQ9 QUESTIONS 1 AND 2: 0
1. LITTLE INTEREST OR PLEASURE IN DOING THINGS: NOT AT ALL

## 2025-04-21 ASSESSMENT — PAIN SCALES - GENERAL: PAINLEVEL_OUTOF10: 7

## 2025-04-21 NOTE — PROGRESS NOTES
"Subjective   Patient ID: Yoandy Durán is a 64 y.o. male who presents for Gout.    Hx HTN, allergic rhinitis, hx of alcohol use (remission x 4 years), cervical and lumbar DDD    Other providers: Dr. Jake Arias ([facility]), has not seen PCP in 4 years and has been out medication for 4 years    Acute concerns: right foot x 6 days, and has progressively worsened. Patient feels may be gout but insure. Wife was given colchicine yesterday which helped.     Not discussed:  HTN: uncontrolled but improving. Should be on 50 mg Metoprolol, 10 mg Amlodipine, and 100 mg Losartan. Home BP not checking. Denies chest pain, heart palpitations, SOB, dizziness, headaches, changes of vision, syncope, leg swelling.    Sleep issues: more trouble falling asleep than staying asleep. Has not tried OTC besides melatonin. Has good sleep hygiene.   Low Kidney function:, UPDATE saw nephrologista nd has 6 month follow-up. Due for labs reprinted. Recall:  In the past stopped diuretic, and patient stopped creatine supplement. GFR improved from 36 to 43 and 2.07 to 1.77 (9/23/24). Only previous GFR was 61 back in 2018 to compare too. But creatinine was normal 5 years ago. Referred to Dr. Townsend (nephrologist) appointment not until 1/27/24. Urine albumin elevated at 145. Has apt with nephrologist this month.   Allergic rhinitis: Controlled on Singulair, which has made significant improvement. Also on daily anti-histamine and Flonase.             Review of Systems   Constitutional:  Negative for chills and fever.   Musculoskeletal:  Positive for arthralgias and joint swelling.   Skin:  Positive for rash.       Objective   /76   Pulse 84   Ht 1.727 m (5' 8\")   Wt 85.3 kg (188 lb)   SpO2 98%   BMI 28.59 kg/m²     Physical Exam  Constitutional:       Appearance: Normal appearance.   Pulmonary:      Effort: Pulmonary effort is normal.   Musculoskeletal:      Right foot: Swelling and tenderness present.        " Feet:    Feet:      Right foot:      Toenail Condition: Fungal disease present.     Left foot:      Toenail Condition: Fungal disease present.     Comments: Right foot swollen, erythematous and TTP  Skin:     Findings: Erythema present.   Neurological:      Mental Status: He is alert.   Psychiatric:         Mood and Affect: Mood and affect normal.         Assessment/Plan   Diagnoses and all orders for this visit:  Acute gout due to renal impairment involving toe of right foot  -     predniSONE (Deltasone) 10 mg tablet; Take 4 tablets (40 mg) by mouth once daily for 3 days, THEN 3 tablets (30 mg) once daily for 3 days, THEN 2 tablets (20 mg) once daily for 2 days, THEN 1 tablet (10 mg) once daily for 2 days.  Benign essential hypertension  -     metoprolol succinate XL (Toprol-XL) 50 mg 24 hr tablet; Take 1 tablet (50 mg) by mouth once daily. Do not crush or chew.  -     amLODIPine (Norvasc) 10 mg tablet; Take 1 tablet (10 mg) by mouth once daily.  Allergic rhinitis due to animal hair and dander  -     montelukast (Singulair) 10 mg tablet; Take 1 tablet (10 mg) by mouth once daily at bedtime.

## 2025-05-02 ENCOUNTER — TELEPHONE (OUTPATIENT)
Dept: PRIMARY CARE | Facility: CLINIC | Age: 65
End: 2025-05-02
Payer: COMMERCIAL

## 2025-05-02 DIAGNOSIS — M10.371 ACUTE GOUT DUE TO RENAL IMPAIRMENT INVOLVING TOE OF RIGHT FOOT: Primary | ICD-10-CM

## 2025-05-02 RX ORDER — COLCHICINE 0.6 MG/1
0.6 TABLET ORAL 2 TIMES DAILY
Qty: 60 TABLET | Refills: 0 | Status: SHIPPED | OUTPATIENT
Start: 2025-05-02 | End: 2025-10-29

## 2025-05-02 NOTE — TELEPHONE ENCOUNTER
Pt called stating he was put on prednisone for gout but he is not tolerating it, pt said he is not sleeping because of it and so he is not taking it which means he is not getting rid of the gout and is asking to be switched over to colchicine. Please advise.

## 2025-05-07 LAB
ALBUMIN SERPL-MCNC: 4.5 G/DL (ref 3.6–5.1)
BUN SERPL-MCNC: 33 MG/DL (ref 7–25)
BUN/CREAT SERPL: 19 (CALC) (ref 6–22)
CALCIUM SERPL-MCNC: 9.9 MG/DL (ref 8.6–10.3)
CHLORIDE SERPL-SCNC: 102 MMOL/L (ref 98–110)
CO2 SERPL-SCNC: 27 MMOL/L (ref 20–32)
CREAT SERPL-MCNC: 1.72 MG/DL (ref 0.7–1.35)
CYSTATIN C SERPL-MCNC: 1.93 MG/L (ref 0.52–1.2)
EGFRCR SERPLBLD CKD-EPI 2021: 44 ML/MIN/1.73M2
GFR/BSA.PRED SERPLBLD CYS-BASED-ARV: 32 ML/MIN/1.73M2
GLUCOSE SERPL-MCNC: 94 MG/DL (ref 65–99)
PHOSPHATE SERPL-MCNC: 4.8 MG/DL (ref 2.5–4.5)
POTASSIUM SERPL-SCNC: 4.2 MMOL/L (ref 3.5–5.3)
SODIUM SERPL-SCNC: 138 MMOL/L (ref 135–146)

## 2025-05-12 ENCOUNTER — OFFICE VISIT (OUTPATIENT)
Dept: PRIMARY CARE | Facility: CLINIC | Age: 65
End: 2025-05-12
Payer: COMMERCIAL

## 2025-05-12 VITALS
OXYGEN SATURATION: 96 % | WEIGHT: 187 LBS | HEIGHT: 68 IN | DIASTOLIC BLOOD PRESSURE: 78 MMHG | SYSTOLIC BLOOD PRESSURE: 140 MMHG | BODY MASS INDEX: 28.34 KG/M2 | HEART RATE: 65 BPM

## 2025-05-12 DIAGNOSIS — J30.81 ALLERGIC RHINITIS DUE TO ANIMAL HAIR AND DANDER: Primary | ICD-10-CM

## 2025-05-12 DIAGNOSIS — N18.32 STAGE 3B CHRONIC KIDNEY DISEASE (MULTI): ICD-10-CM

## 2025-05-12 DIAGNOSIS — I10 BENIGN ESSENTIAL HYPERTENSION: ICD-10-CM

## 2025-05-12 DIAGNOSIS — F51.01 PRIMARY INSOMNIA: ICD-10-CM

## 2025-05-12 PROCEDURE — 3008F BODY MASS INDEX DOCD: CPT

## 2025-05-12 PROCEDURE — 99214 OFFICE O/P EST MOD 30 MIN: CPT

## 2025-05-12 PROCEDURE — 3077F SYST BP >= 140 MM HG: CPT

## 2025-05-12 PROCEDURE — 3078F DIAST BP <80 MM HG: CPT

## 2025-05-12 ASSESSMENT — ENCOUNTER SYMPTOMS
COUGH: 0
FATIGUE: 0
DIZZINESS: 0
SLEEP DISTURBANCE: 1
LIGHT-HEADEDNESS: 0
RHINORRHEA: 0
SHORTNESS OF BREATH: 0
PALPITATIONS: 0

## 2025-05-12 ASSESSMENT — PAIN SCALES - GENERAL: PAINLEVEL_OUTOF10: 4

## 2025-05-12 ASSESSMENT — PATIENT HEALTH QUESTIONNAIRE - PHQ9
SUM OF ALL RESPONSES TO PHQ9 QUESTIONS 1 AND 2: 0
2. FEELING DOWN, DEPRESSED OR HOPELESS: NOT AT ALL
1. LITTLE INTEREST OR PLEASURE IN DOING THINGS: NOT AT ALL

## 2025-05-12 NOTE — PROGRESS NOTES
Subjective   Patient ID: Yoandy Durán is a 64 y.o. male who presents for Hypertension.    Hx HTN, allergic rhinitis, hx of alcohol use (remission x 4 years), cervical and lumbar DDD    Other providers: Dr. Jake Arias ([facility]), has not seen PCP in 4 years and has been out medication for 4 years.     HTN: improving. Last visit switched losartan to Olmesartan for better control, patient states at 20 mg feels fine but at 40 mg  feels anxious and lethargic. Also on 50 mg Metoprolol, 10 mg Amlodipine. Home BP not checking. Denies chest pain, heart palpitations, SOB, dizziness, headaches, changes of vision, syncope, leg swelling.      Sleep issues: Last visit started on hydroxyzine prn, uses every-night. Sleep improving but not perfect. Recall: more trouble falling asleep than staying asleep. Has not tried OTC besides melatonin. Has good sleep hygiene.     Low Kidney function:, UPDATE saw nephrologist and has 6 month follow-up. Due for labs reprinted. Recall:  In the past stopped diuretic, and patient stopped creatine supplement. GFR improved from 36 to 43 and 2.07 to 1.77 (9/23/24). Only previous GFR was 61 back in 2018 to compare too. But creatinine was normal 5 years ago. Referred to Dr. Townsend (nephrologist) appointment not until 1/27/24. Urine albumin elevated at 145. Has apt with nephrologist this month.     Allergic rhinitis: Controlled on Singulair, which has made significant improvement. Also on daily anti-histamine and Flonase.             Review of Systems   Constitutional:  Negative for fatigue.   HENT:  Negative for congestion and rhinorrhea.    Eyes:  Negative for visual disturbance.   Respiratory:  Negative for cough and shortness of breath.    Cardiovascular:  Negative for chest pain, palpitations and leg swelling.   Neurological:  Negative for dizziness, syncope and light-headedness.   Psychiatric/Behavioral:  Positive for sleep disturbance.        Objective   /78    "Pulse 65   Ht 1.727 m (5' 8\")   Wt 84.8 kg (187 lb)   SpO2 96%   BMI 28.43 kg/m²     Physical Exam  Constitutional:       General: He is not in acute distress.     Appearance: Normal appearance.   Cardiovascular:      Rate and Rhythm: Normal rate and regular rhythm.      Heart sounds: No murmur heard.  Pulmonary:      Effort: Pulmonary effort is normal.      Breath sounds: Normal breath sounds. No wheezing, rhonchi or rales.   Musculoskeletal:      Right lower leg: No edema.      Left lower leg: No edema.   Skin:     General: Skin is warm and dry.      Findings: No rash.   Neurological:      Mental Status: He is alert.   Psychiatric:         Mood and Affect: Mood and affect normal.         Assessment/Plan   Diagnoses and all orders for this visit:  Allergic rhinitis due to animal hair and dander  Benign essential hypertension  Primary insomnia  Stage 3b chronic kidney disease (Multi)  Stay on current medications, can double sleeping pill if needed. Has nephrologist appt in 2 months, can follow-up in 5 months for CPE.        "

## 2025-07-24 LAB
ALBUMIN SERPL-MCNC: 4.8 G/DL (ref 3.6–5.1)
BUN SERPL-MCNC: 28 MG/DL (ref 7–25)
BUN/CREAT SERPL: 15 (CALC) (ref 6–22)
CALCIUM SERPL-MCNC: 9.9 MG/DL (ref 8.6–10.3)
CHLORIDE SERPL-SCNC: 101 MMOL/L (ref 98–110)
CO2 SERPL-SCNC: 30 MMOL/L (ref 20–32)
CREAT SERPL-MCNC: 1.81 MG/DL (ref 0.7–1.35)
EGFRCR SERPLBLD CKD-EPI 2021: 42 ML/MIN/1.73M2
GLUCOSE SERPL-MCNC: 93 MG/DL (ref 65–99)
PHOSPHATE SERPL-MCNC: 4.1 MG/DL (ref 2.5–4.5)
POTASSIUM SERPL-SCNC: 4.2 MMOL/L (ref 3.5–5.3)
SODIUM SERPL-SCNC: 139 MMOL/L (ref 135–146)

## 2025-07-27 DIAGNOSIS — I10 BENIGN ESSENTIAL HYPERTENSION: ICD-10-CM

## 2025-07-27 DIAGNOSIS — N18.32 STAGE 3B CHRONIC KIDNEY DISEASE (MULTI): ICD-10-CM

## 2025-07-28 ENCOUNTER — APPOINTMENT (OUTPATIENT)
Dept: NEPHROLOGY | Facility: CLINIC | Age: 65
End: 2025-07-28
Payer: COMMERCIAL

## 2025-07-28 VITALS
SYSTOLIC BLOOD PRESSURE: 169 MMHG | TEMPERATURE: 97.7 F | WEIGHT: 186.6 LBS | HEART RATE: 75 BPM | HEIGHT: 68 IN | BODY MASS INDEX: 28.28 KG/M2 | DIASTOLIC BLOOD PRESSURE: 94 MMHG | OXYGEN SATURATION: 94 %

## 2025-07-28 DIAGNOSIS — N18.32 STAGE 3B CHRONIC KIDNEY DISEASE (MULTI): Primary | ICD-10-CM

## 2025-07-28 DIAGNOSIS — I10 BENIGN ESSENTIAL HYPERTENSION: ICD-10-CM

## 2025-07-28 DIAGNOSIS — E79.0 HYPERURICEMIA: ICD-10-CM

## 2025-07-28 PROCEDURE — 3008F BODY MASS INDEX DOCD: CPT | Performed by: INTERNAL MEDICINE

## 2025-07-28 PROCEDURE — 3079F DIAST BP 80-89 MM HG: CPT | Performed by: INTERNAL MEDICINE

## 2025-07-28 PROCEDURE — 99214 OFFICE O/P EST MOD 30 MIN: CPT | Performed by: INTERNAL MEDICINE

## 2025-07-28 PROCEDURE — 3077F SYST BP >= 140 MM HG: CPT | Performed by: INTERNAL MEDICINE

## 2025-07-28 NOTE — PROGRESS NOTES
Subjective       Yoandy Durán is a 64 y.o. male who has past medical history of hypertension, chronic back pain with significant NSAID use, creatinine supplement was coming to see me for a follow up.    Last seen in January 2025. Patient came alone for this visit. Discussed the lab work indicating Cystatin C 1.9, GFR 44, and BUN 28. Patient reports he is still taking Cr supplement. Patient admits to taking prednisone and Ibuprofen about 3-4 pills for a few day right before the repeat lab. Patient had a gout flare on 4/21/25 and saw his PCP, who started him on Colchicine. Patient is recommended to have repeat blood work for Cystatin C, uric acid, in the next 2 weeks. Patient is recommended to take no Cr supplement, NSAID, colchicine, and steroid for the next 2 weeks before the blood work to obtain a baseline Cr. His blood pressure is elevated today at the office. Patient did not take Amlodipine yesterday and has been anxious about today's visit. He denies lower edema. He keeps himself hydrated. Patient is recommended to stay hydrated.     Prior notes    Yoandy came alone today.  No immediate complaints or concerns.  He was made aware of worsening kidney function in the summer 2024 since then he stopped taking NSAIDs and creatinine supplements.  He denies lower urinary tract symptoms.  No leg swelling or shortness of breath.  No cardiac comorbidities.  No diabetes.  He does not check blood pressure at home.  He denies family history of kidney disease.  He has a [employer].  He vapes marijuana.  He chewed tobacco.  He does not drink      Objective   There were no vitals taken for this visit.  Wt Readings from Last 3 Encounters:   05/12/25 84.8 kg (187 lb)   04/21/25 85.3 kg (188 lb)   04/14/25 84.8 kg (187 lb)       Physical Exam    General appearance: no distress awake and alert on room air, euvolemic on exam  Eyes: non-icteric  HEENT: atrumatic head, PEERLA, moist mucosa  Skin: no apparent rash  Heart: NSR, S1, S2  "normal, no murmur or gallop  Lungs: Symmetrical expansion,CTA bilat no wheezing/crackles  Abdomen: soft, nt/nd, obese  Extremities: no edema bilat  Neuro: No FND,asterixis, no focal deficits noticed        Review of Systems     Constitutional: no fever, no chills, no recent weight gain and no recent weight loss.   Eyes: no blurred vision and no diplopia.   ENT: no hearing loss, no earache, no sore throat, no swollen glands in the neck and no nasal discharge.   Cardiovascular: no chest pain, no palpitations and no lower extremity edema.   Respiratory: no shortness of breath, no chronic cough and no shortness of breath during exertion.   Gastrointestinal: no abdominal pain, no constipation, no heartburn, no vomiting, no bloody stools and no change in bowel movements.   Genitourinary: no dysuria and no hematuria.   Musculoskeletal: Back pain  Skin: no rashes and no skin lesions.   Neurological: no headaches and no dizziness.   Psychiatric: no confusion, no depression and no anxiety.   Endocrine: no heat intolerance, no cold intolerance, appetite not increased, no thyroid disorder, no increased urinary frequency and no dry skin.   Hematologic/Lymphatic: does not bleed easily and does not bruise easily.   All other systems have been reviewed and are negative for complaint.         Data Review                   No results found for: \"URICACID\"        No results found for: \"HGBA1C\"        Results from last 7 days   Lab Units 07/23/25  1341   [facility] SODIUM mmol/L 139   [facility] POTASSIUM mmol/L 4.2   [facility] CHLORIDE mmol/L 101   [facility] CO2 mmol/L 30   [facility] BUN mg/dL 28*   [facility] GLUCOSE mg/dL 93   [facility] CALCIUM mg/dL 9.9   QUEST EGFR mL/min/1.73m2 42*           Albumin/Creatinine Ratio   Date Value Ref Range Status   08/26/2024 85.6 ug/mg Creat Final            RFP  Recent Labs     07/23/25  1341 05/05/25  1040 10/14/24  0923 09/23/24  0944 08/19/24  0844 08/11/19  0441 08/10/19  0500 08/09/19  1349 04/24/19  1847    138 " "140 140 142 139 135 125* 137   K 4.2 4.2 4.2 4.3 4.5 3.9 4.3 4.2 4.2    102 100 102 102 100 95* 84* 96*   CO2 30 27 33* 28 31 27 25 16* 22*   BUN 28* 33* 28* 29* 29* 19 24 32* 20   CREATININE 1.81* 1.72* 1.76* 1.77* 2.07* 1.4 1.3 1.5 1.5   GLUCOSE 93 94 109* 92 98 126* 84 101* 92   CALCIUM 9.9 9.9 10.2 9.8 9.7 9.4 9.3 9.3 9.1   PHOS 4.1 4.8*  --  3.8  --   --  3.1  --  4.6*   EGFR 42* 44* 43* 43* 36*  --   --  51 51   ANIONGAP  --   --  11 14 14 12 15 25* 19        Urineanalysis  Recent Labs     04/24/19 1847 12/22/18  1654   COLORU PALE YELLOW PALE YELLOW   APPEARANCEU CLEAR CLEAR   SPECGRAVU 1.006 1.007   YAMILETH 6.0 5.5   PROTUR NEGATIVE NEGATIVE   GLUCOSEU NEGATIVE NEGATIVE   BLOODU NEGATIVE NEGATIVE   KETONESU NEGATIVE NEGATIVE   BILIRUBINU NEGATIVE NEGATIVE   NITRITEU NEGATIVE NEGATIVE   LEUKOCYTESU NEGATIVE NEGATIVE       Urine Electrolytes  Recent Labs     08/26/24  1041 04/24/19 1847 12/22/18  1654   CREATU 169.3  --   --    PROTUR  --  NEGATIVE NEGATIVE   ALBUMINUR 145.0*  --   --    MICROALBCREA 85.6  --   --         Urine Micro  Recent Labs     04/24/19 1847 12/22/18  1654   WBCU NONE SEEN NONE SEEN   RBCU 3 NONE SEEN   HYALCASTU NONE SEEN  --    SQUAMEPIU NONE SEEN  --    BACTERIAU NEGATIVE  --         Iron  No results for input(s): \"IRON\", \"TIBC\", \"IRONSAT\", \"FERRITIN\" in the last [id number] hours.       Current Outpatient Medications on File Prior to Visit   Medication Sig Dispense Refill    amLODIPine (Norvasc) 10 mg tablet Take 1 tablet (10 mg) by mouth once daily. 90 tablet 1    colchicine 0.6 mg tablet Take 1 tablet (0.6 mg) by mouth 2 times a day. 60 tablet 0    fluticasone (Flonase) 50 mcg/actuation nasal spray Administer 1 spray into each nostril once daily. Shake gently. Before first use, prime pump. After use, clean tip and replace cap. 16 g 12    hydrOXYzine HCL (Atarax) 25 mg tablet Take 1 tablet (25 mg) by mouth once daily. 30 tablet 1    ibuprofen 200 mg tablet Take 1 tablet (200 mg) by " mouth if needed for mild pain (1 - 3). Pt. Reports once weekly      methocarbamol (Robaxin) 750 mg tablet take 1 tablet by mouth two times every day      metoprolol succinate XL (Toprol-XL) 50 mg 24 hr tablet Take 1 tablet (50 mg) by mouth once daily. Do not crush or chew. 90 tablet 1    montelukast (Singulair) 10 mg tablet Take 1 tablet (10 mg) by mouth once daily at bedtime. 90 tablet 1    olmesartan (BENIcar) 40 mg tablet Take 1 tablet (40 mg) by mouth once daily. 90 tablet 0     No current facility-administered medications on file prior to visit.           Assessment and Plan       Yoandy Durán  is a 64 y.o. male who has past medical history of hypertension, chronic back pain with significant NSAID use, creatinine supplement was coming to see me today initial consultation for elevated serum creatinine per PCP    # Elevated serum creatinine/chronic kidney disease stage IIIa/A1-  -Baseline serum creatinine 1.7-2, GFR 35-45 by CKD-EPI.  Cystatin C 1.3, GFR Cystatin C 54 which I think is more accurate  -I think this gentleman has a background of chronic kidney disease due to NSAID use.  Serum creatinine is significantly elevated (up to 2) most likely due to creatinine supplements.  Currently he is off NSAIDs/creatinine supplements.  Monitoring serum creatinine and Cystatin C. Cyctacin C 1.9 in 05/2025. Repeat Cystatin C in 2 weeks. Recommended to discontinue Cr supplement, NSAIDs, and steroids 2 weeks prior to the repeat blood work.  -Within normal electrolytes  -Prior spot test ACR is negative  -No recent kidney image to review-will defer at this time  - Repeat lab work a week before his next appointment    # Hypertension-elevated today.  He is anxious about the visit?  Whitecoat syndrome  -Current medication amlodipine 10 mg, losartan 100 mg, metoprolol 50 mg.  Currently off diuretics  -He will start checking at home and report if it is elevated    # No significant anemia-continue to monitor    # CKD-MBD.   Phosphorus, calcium, albumin, PTH and vitamin D ordered but not collected.    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 Discontinue Colchicine 2 weeks prior to the lab      #Others  - No NSAIDs, no contrast as possible. If to be done- we recommend holding ACEi/ARBS/diuretics 24 hrs prior to contrast exposure and ensure appropriate hydration   - Ensure well hydration  - Limit salt in diet  - No smoking    Patient received CKD education and counselling  Blood work in 2 weeks. Repeat blood work 1 week before his next appointment.   Follow-up in 6 months with review of other canasta prior to visit        Jennie Quiles DO  Internal Medicine - PGY 1    Shashi Townsend MD, MS, ASHER OREILLY   Clinical  - [employer] [employer]   Nephrologist - [facility] - [facility]

## 2025-07-28 NOTE — PATIENT INSTRUCTIONS
Dear Yoandy-it was nice meeting you nephrology clinic today discuss the following    # Chronic kidney stage IIIa-baseline kidney function 45-55%.  Will continue to monitor.  We discussed appropriate fluid intake, avoid nonsteroidal anti-inflammatory drugs.  Will hold off creatinine supplement at this time-repeat blood work in 2 weeks    # Hypertension- elevated.  Missing some doses.  Continue amlodipine 10 mg,   [id number] mg, metoprolol 50 mg.  Today we discussed checking blood pressure at home.  Average reading should range 110-140.  If blood pressure reading is consistently above 150 please call my office to adjust medications    #  recent gout flares-monitor uric acid    Repeat blood work in 2 weeks-recommendation to follow regarding kidney function and uric acid    Follow-up in 6 months with blood repeat blood work and urinalysis prior to next visit    Shashi Townsend MD, MS, ASHER OREILLY   Clinical  - [facility] [employer]   Nephrologist - [facility] - [facility]

## 2025-07-29 DIAGNOSIS — I10 BENIGN ESSENTIAL HYPERTENSION: ICD-10-CM

## 2025-07-29 RX ORDER — METOPROLOL SUCCINATE 50 MG/1
50 TABLET, EXTENDED RELEASE ORAL DAILY
Qty: 90 TABLET | Refills: 1 | Status: SHIPPED | OUTPATIENT
Start: 2025-07-29 | End: 2026-01-25

## 2025-07-29 RX ORDER — AMLODIPINE BESYLATE 10 MG/1
10 TABLET ORAL DAILY
Qty: 90 TABLET | Refills: 1 | Status: SHIPPED | OUTPATIENT
Start: 2025-07-29 | End: 2026-01-25

## 2025-08-11 DIAGNOSIS — N18.32 STAGE 3B CHRONIC KIDNEY DISEASE (MULTI): ICD-10-CM

## 2025-08-11 DIAGNOSIS — I10 BENIGN ESSENTIAL HYPERTENSION: ICD-10-CM

## 2025-08-11 DIAGNOSIS — E79.0 HYPERURICEMIA: ICD-10-CM

## 2026-03-16 ENCOUNTER — APPOINTMENT (OUTPATIENT)
Dept: NEPHROLOGY | Facility: CLINIC | Age: 66
End: 2026-03-16
Payer: COMMERCIAL